# Patient Record
(demographics unavailable — no encounter records)

---

## 2025-01-27 NOTE — PHYSICAL EXAM
[Normocephalic] : normocephalic [Atraumatic] : atraumatic [EOMI] : extra ocular movement intact [Supple] : supple [No Supraclavicular Adenopathy] : no supraclavicular adenopathy [No Cervical Adenopathy] : no cervical adenopathy [Examined in the supine and seated position] : examined in the supine and seated position [No dominant masses] : no dominant masses left breast [No Nipple Retraction] : no left nipple retraction [No Nipple Discharge] : no left nipple discharge [Breast Mass Right Breast ___cm] : no masses [Breast Mass Left Breast ___cm] : no masses [No Axillary Lymphadenopathy] : no left axillary lymphadenopathy [No Edema] : no edema [No Rashes] : no rashes [No Ulceration] : no ulceration [Breast Nipple Inversion Left] : nipple not inverted [Breast Nipple Retraction Left] : nipple not retracted [Breast Nipple Flattening Left] : nipple not flattened [Breast Nipple Fissures Left] : nipple not fissured [Breast Abnormal Lactation (Galactorrhea) Left] : no galactorrhea [Breast Abnormal Secretion Bloody Fluid Left] : no bloody discharge [Breast Abnormal Secretion Serous Fluid Left] : no serous discharge [Breast Abnormal Secretion Opalescent Fluid Left] : no milky discharge [de-identified] : On exam, she has the obvious mastectomy on the right side without reconstruction and has a small B-cup breast on the left side.  On palpation, she has no evidence of recurrence along the right chest wall and no suspicious findings in the left breast.  She has no axillary, supraclavicular, or cervical adenopathy.  She does have some redness high up in her right axilla consistent with a localized cellulitis which seems to be improving on oral antibiotics. [de-identified] : Status post total mastectomy without reconstruction with no evidence of recurrence on the chest wall [de-identified] : Superficial skin cellulitis in the right axillary region improving on oral antibiotics. [de-identified] : Right axillary erythema with localized cellulitis improving on oral antibiotics.

## 2025-01-27 NOTE — REASON FOR VISIT
[Initial Eval - Existing Diagnosis] : an initial evaluation of an existing diagnosis [FreeTextEntry1] : The patient is a nulliparous white female who is severely disabled with cerebral palsy and a  shunt and is of [place] and [place] descent.  She is wheelchair-bound and nonverbal.  She has a family history of breast cancer and was diagnosed with a right breast cancer around September 2014 which presented as a palpable mass.  Core biopsy showed an intermediate to high-grade invasive duct cancer which was ER/KY positive HER2/bobbi negative with a low Ki-67.  She underwent a right breast total mastectomy and sentinel lymph node biopsy on November 17, 2014 and final pathology showed a 3.3 cm infiltrating ductal cancer which was low-grade with free margins and 3 negative sentinel lymph nodes but 1 node showing micrometastasis measuring 1.8 mm.  MammaPrint genomic profiling showed a high risk luminal B subtype.  BRCA testing was negative in 2014.  She was followed by Dr. Mcfarlane and placed on Lupron injections and Arimidex.  She was lost to follow-up in 2016 and comes in now after she developed an infection in her right axillary region about 10 days ago and was placed on doxycycline and comes in now for a surgical evaluation.

## 2025-01-27 NOTE — HISTORY OF PRESENT ILLNESS
[FreeTextEntry1] : The patient is a 47-year-old nulliparous white female of [place] and [place] descent.  She underwent menarche at age 12.  She is severely disabled with cerebral palsy and a  shunt and is wheelchair-bound and nonverbal.  She has a family history with her paternal great aunt who had breast cancer in her 60s.  The patient was diagnosed with a right breast cancer in September 2014 after she was found to have a palpable breast mass and ultrasound showed an irregular 2.5 cm hypoechoic density.  Ultrasound core biopsy on September 15, 2014 at [facility] showed an intermediate to high-grade invasive duct cancer which was ER/WI strongly positive, HER2 2+ by IHC but negative by FISH with a ratio of 1.1 and HER2 count of 2.2.  Ki-67 was low at 4%.  She was discussed at tumor board and given her severe handicap she underwent a right breast total mastectomy and sentinel lymph node biopsy which was performed on November 17, 2014.  Final pathology showed a 3.3 cm infiltrating duct cancer which was low-grade with surrounding DCIS measuring almost 3.3 cm but surgical margins were free.  She had 3 negative sentinel lymph nodes with 1 node showing on micro metastasis measuring 1.8 mm.  This would currently be considered a pathologic prognostic stage IA breast cancer.  MammaPrint genomic profile showed a high risk 1 luminal B subtype.  She had comprehensive BRCA testing done in October 2014 which was negative.  She was discussed at tumor board and decision was made just to place her on monthly Lupron injections and Arimidex and she was following up with Dr. Mcfarlane.  She was lost to follow-up in 2016 and she comes in now after she developed some drainage and redness and her right axillary region which was diagnosed as a localized cellulitis and she was placed on doxycycline.  She underwent a bilateral ultrasound with attention to this right axillary region and was found to have just some focal skin thickening consistent with a localized cellulitis.

## 2025-01-27 NOTE — ASSESSMENT
[FreeTextEntry1] : The patient is a 47-year-old nulliparous white female of [place] and [place] descent.  She underwent menarche at age 12.  She is severely disabled with cerebral palsy and a  shunt and is wheelchair-bound and nonverbal.  She has a family history with her paternal great aunt who had breast cancer in her 60s.  The patient was diagnosed with a right breast cancer in September 2014 after she was found to have a palpable breast mass and ultrasound showed an irregular 2.5 cm hypoechoic density.  Ultrasound core biopsy on September 15, 2014 at [facility] showed an intermediate to high-grade invasive duct cancer which was ER/IL strongly positive, HER2 2+ by IHC but negative by FISH with a ratio of 1.1 and HER2 count of 2.2.  Ki-67 was low at 4%.  She was discussed at tumor board and given her severe handicap she underwent a right breast total mastectomy and sentinel lymph node biopsy which was performed on November 17, 2014.  Final pathology showed a 3.3 cm infiltrating duct cancer which was low-grade with surrounding DCIS measuring almost 3.3 cm but surgical margins were free.  She had 3 negative sentinel lymph nodes with 1 node showing on micro metastasis measuring 1.8 mm.  This would currently be considered a pathologic prognostic stage IA breast cancer.  MammaPrint genomic profile showed a high risk 1 luminal B subtype.  She had comprehensive BRCA testing done in October 2014 which was negative.  She was discussed at tumor board and decision was made just to place her on monthly Lupron injections and Arimidex and she was following up with Dr. Mcfarlane.  She was lost to follow-up in 2016 and she comes in now after she developed some drainage and redness and her right axillary region which was diagnosed as a localized cellulitis and she was placed on doxycycline.  She underwent a bilateral ultrasound with attention to this right axillary region and was found to have just some focal skin thickening consistent with a localized cellulitis.  On exam today, she is healed well on her right chest wall and has no evidence of recurrence.  Her left breast is negative.  She has no axillary adenopathy.  She does have this superficial cellulitis high up in the skin of the right axilla which seems to be improving on oral antibiotics.  She has minimal discomfort from this.  The aide was advised that this is an improving cellulitis and I did fill out my recommendations on the form to her facility to continue the doxycycline for another week and continue local cleansing in this area and to keep it dry.  This should resolve spontaneously.  There is no need for me to continue routine breast surgery follow-up and she should continue to follow-up with Dr. Mcfarlane and remain on Lupron and anastrozole.  I can see her on a as needed basis.

## 2025-01-27 NOTE — ADDENDUM
[FreeTextEntry1] : I spent greater than 75% of the consultation in face-to-face counseling and coordination care in this patient with a history of a right breast cancer who underwent a mastectomy and has been getting routine follow-up through her medical oncologist but comes in now with some cellulitis in her right axillary region for a breast surgical consultation.